# Patient Record
Sex: MALE | Race: ASIAN | NOT HISPANIC OR LATINO | URBAN - METROPOLITAN AREA
[De-identification: names, ages, dates, MRNs, and addresses within clinical notes are randomized per-mention and may not be internally consistent; named-entity substitution may affect disease eponyms.]

---

## 2017-01-01 ENCOUNTER — INPATIENT (INPATIENT)
Facility: HOSPITAL | Age: 77
LOS: 2 days | DRG: 871 | End: 2017-01-11
Attending: INTERNAL MEDICINE | Admitting: INTERNAL MEDICINE
Payer: MEDICARE

## 2017-01-01 VITALS
RESPIRATION RATE: 24 BRPM | OXYGEN SATURATION: 100 % | TEMPERATURE: 99 F | SYSTOLIC BLOOD PRESSURE: 170 MMHG | DIASTOLIC BLOOD PRESSURE: 81 MMHG | HEART RATE: 111 BPM

## 2017-01-01 VITALS
RESPIRATION RATE: 16 BRPM | SYSTOLIC BLOOD PRESSURE: 138 MMHG | TEMPERATURE: 97 F | HEART RATE: 105 BPM | OXYGEN SATURATION: 90 % | DIASTOLIC BLOOD PRESSURE: 88 MMHG

## 2017-01-01 DIAGNOSIS — K56.69 OTHER INTESTINAL OBSTRUCTION: ICD-10-CM

## 2017-01-01 DIAGNOSIS — Z95.810 PRESENCE OF AUTOMATIC (IMPLANTABLE) CARDIAC DEFIBRILLATOR: Chronic | ICD-10-CM

## 2017-01-01 LAB
ALBUMIN SERPL ELPH-MCNC: 2.7 G/DL — LOW (ref 3.3–5)
ALBUMIN SERPL ELPH-MCNC: 2.9 G/DL — LOW (ref 3.3–5)
ALBUMIN SERPL ELPH-MCNC: 3.1 G/DL — LOW (ref 3.3–5)
ALBUMIN SERPL ELPH-MCNC: 3.4 G/DL — SIGNIFICANT CHANGE UP (ref 3.3–5)
ALP SERPL-CCNC: 58 U/L — SIGNIFICANT CHANGE UP (ref 40–120)
ALP SERPL-CCNC: 60 U/L — SIGNIFICANT CHANGE UP (ref 40–120)
ALP SERPL-CCNC: 61 U/L — SIGNIFICANT CHANGE UP (ref 40–120)
ALP SERPL-CCNC: 73 U/L — SIGNIFICANT CHANGE UP (ref 40–120)
ALT FLD-CCNC: 15 U/L — SIGNIFICANT CHANGE UP (ref 12–78)
ALT FLD-CCNC: 16 U/L — SIGNIFICANT CHANGE UP (ref 12–78)
ANION GAP SERPL CALC-SCNC: 12 MMOL/L — SIGNIFICANT CHANGE UP (ref 5–17)
ANION GAP SERPL CALC-SCNC: 7 MMOL/L — SIGNIFICANT CHANGE UP (ref 5–17)
ANION GAP SERPL CALC-SCNC: 8 MMOL/L — SIGNIFICANT CHANGE UP (ref 5–17)
ANION GAP SERPL CALC-SCNC: 9 MMOL/L — SIGNIFICANT CHANGE UP (ref 5–17)
APTT BLD: 30.8 SEC — SIGNIFICANT CHANGE UP (ref 27.5–37.4)
AST SERPL-CCNC: 13 U/L — LOW (ref 15–37)
AST SERPL-CCNC: 15 U/L — SIGNIFICANT CHANGE UP (ref 15–37)
AST SERPL-CCNC: 15 U/L — SIGNIFICANT CHANGE UP (ref 15–37)
AST SERPL-CCNC: 33 U/L — SIGNIFICANT CHANGE UP (ref 15–37)
BASOPHILS # BLD AUTO: 0 K/UL — SIGNIFICANT CHANGE UP (ref 0–0.2)
BASOPHILS NFR BLD AUTO: 0.2 % — SIGNIFICANT CHANGE UP (ref 0–2)
BILIRUB DIRECT SERPL-MCNC: 0.2 MG/DL — SIGNIFICANT CHANGE UP (ref 0.05–0.2)
BILIRUB INDIRECT FLD-MCNC: 0.4 MG/DL — SIGNIFICANT CHANGE UP (ref 0.2–1)
BILIRUB SERPL-MCNC: 0.4 MG/DL — SIGNIFICANT CHANGE UP (ref 0.2–1.2)
BILIRUB SERPL-MCNC: 0.5 MG/DL — SIGNIFICANT CHANGE UP (ref 0.2–1.2)
BILIRUB SERPL-MCNC: 0.6 MG/DL — SIGNIFICANT CHANGE UP (ref 0.2–1.2)
BILIRUB SERPL-MCNC: 0.7 MG/DL — SIGNIFICANT CHANGE UP (ref 0.2–1.2)
BUN SERPL-MCNC: 38 MG/DL — HIGH (ref 7–23)
BUN SERPL-MCNC: 40 MG/DL — HIGH (ref 7–23)
BUN SERPL-MCNC: 40 MG/DL — HIGH (ref 7–23)
BUN SERPL-MCNC: 45 MG/DL — HIGH (ref 7–23)
CALCIUM SERPL-MCNC: 8.4 MG/DL — LOW (ref 8.5–10.1)
CALCIUM SERPL-MCNC: 8.4 MG/DL — LOW (ref 8.5–10.1)
CALCIUM SERPL-MCNC: 9.3 MG/DL — SIGNIFICANT CHANGE UP (ref 8.5–10.1)
CALCIUM SERPL-MCNC: 9.5 MG/DL — SIGNIFICANT CHANGE UP (ref 8.5–10.1)
CHLORIDE SERPL-SCNC: 88 MMOL/L — LOW (ref 96–108)
CHLORIDE SERPL-SCNC: 96 MMOL/L — SIGNIFICANT CHANGE UP (ref 96–108)
CHLORIDE SERPL-SCNC: 97 MMOL/L — SIGNIFICANT CHANGE UP (ref 96–108)
CHLORIDE SERPL-SCNC: 99 MMOL/L — SIGNIFICANT CHANGE UP (ref 96–108)
CK SERPL-CCNC: 189 U/L — SIGNIFICANT CHANGE UP (ref 26–308)
CK SERPL-CCNC: 36 U/L — SIGNIFICANT CHANGE UP (ref 26–308)
CK SERPL-CCNC: 46 U/L — SIGNIFICANT CHANGE UP (ref 26–308)
CO2 SERPL-SCNC: 33 MMOL/L — HIGH (ref 22–31)
CO2 SERPL-SCNC: 34 MMOL/L — HIGH (ref 22–31)
CO2 SERPL-SCNC: 36 MMOL/L — HIGH (ref 22–31)
CO2 SERPL-SCNC: 37 MMOL/L — HIGH (ref 22–31)
CREAT SERPL-MCNC: 1.3 MG/DL — SIGNIFICANT CHANGE UP (ref 0.5–1.3)
CREAT SERPL-MCNC: 1.4 MG/DL — HIGH (ref 0.5–1.3)
CREAT SERPL-MCNC: 1.5 MG/DL — HIGH (ref 0.5–1.3)
CREAT SERPL-MCNC: 1.9 MG/DL — HIGH (ref 0.5–1.3)
EOSINOPHIL # BLD AUTO: 0 K/UL — SIGNIFICANT CHANGE UP (ref 0–0.5)
EOSINOPHIL NFR BLD AUTO: 0.1 % — SIGNIFICANT CHANGE UP (ref 0–6)
GLUCOSE SERPL-MCNC: 152 MG/DL — HIGH (ref 70–99)
GLUCOSE SERPL-MCNC: 172 MG/DL — HIGH (ref 70–99)
GLUCOSE SERPL-MCNC: 173 MG/DL — HIGH (ref 70–99)
GLUCOSE SERPL-MCNC: 292 MG/DL — HIGH (ref 70–99)
HCT VFR BLD CALC: 37.7 % — LOW (ref 39–50)
HCT VFR BLD CALC: 38.6 % — LOW (ref 39–50)
HCT VFR BLD CALC: 41 % — SIGNIFICANT CHANGE UP (ref 39–50)
HCT VFR BLD CALC: 48.2 % — SIGNIFICANT CHANGE UP (ref 39–50)
HGB BLD-MCNC: 12.1 G/DL — LOW (ref 13–17)
HGB BLD-MCNC: 12.6 G/DL — LOW (ref 13–17)
HGB BLD-MCNC: 13.7 G/DL — SIGNIFICANT CHANGE UP (ref 13–17)
HGB BLD-MCNC: 15.5 G/DL — SIGNIFICANT CHANGE UP (ref 13–17)
INR BLD: 1.04 RATIO — SIGNIFICANT CHANGE UP (ref 0.88–1.16)
INR BLD: 1.06 RATIO — SIGNIFICANT CHANGE UP (ref 0.88–1.16)
INR BLD: 1.17 RATIO — HIGH (ref 0.88–1.16)
INR BLD: 1.29 RATIO — HIGH (ref 0.88–1.16)
LACTATE SERPL-SCNC: 1.9 MMOL/L — SIGNIFICANT CHANGE UP (ref 0.7–2)
LIDOCAIN IGE QN: 79 U/L — SIGNIFICANT CHANGE UP (ref 73–393)
LYMPHOCYTES # BLD AUTO: 0.7 K/UL — LOW (ref 1–3.3)
LYMPHOCYTES # BLD AUTO: 5 % — LOW (ref 13–44)
MAGNESIUM SERPL-MCNC: 2.4 MG/DL — SIGNIFICANT CHANGE UP (ref 1.8–2.4)
MAGNESIUM SERPL-MCNC: 2.5 MG/DL — HIGH (ref 1.8–2.4)
MAGNESIUM SERPL-MCNC: 2.6 MG/DL — HIGH (ref 1.8–2.4)
MCHC RBC-ENTMCNC: 27 PG — SIGNIFICANT CHANGE UP (ref 27–34)
MCHC RBC-ENTMCNC: 27.4 PG — SIGNIFICANT CHANGE UP (ref 27–34)
MCHC RBC-ENTMCNC: 27.7 PG — SIGNIFICANT CHANGE UP (ref 27–34)
MCHC RBC-ENTMCNC: 28 PG — SIGNIFICANT CHANGE UP (ref 27–34)
MCHC RBC-ENTMCNC: 32.1 GM/DL — SIGNIFICANT CHANGE UP (ref 32–36)
MCHC RBC-ENTMCNC: 32.2 GM/DL — SIGNIFICANT CHANGE UP (ref 32–36)
MCHC RBC-ENTMCNC: 32.7 GM/DL — SIGNIFICANT CHANGE UP (ref 32–36)
MCHC RBC-ENTMCNC: 33.3 GM/DL — SIGNIFICANT CHANGE UP (ref 32–36)
MCV RBC AUTO: 84.1 FL — SIGNIFICANT CHANGE UP (ref 80–100)
MCV RBC AUTO: 84.1 FL — SIGNIFICANT CHANGE UP (ref 80–100)
MCV RBC AUTO: 84.6 FL — SIGNIFICANT CHANGE UP (ref 80–100)
MCV RBC AUTO: 85.2 FL — SIGNIFICANT CHANGE UP (ref 80–100)
MONOCYTES # BLD AUTO: 0.7 K/UL — SIGNIFICANT CHANGE UP (ref 0–0.9)
MONOCYTES NFR BLD AUTO: 4.8 % — SIGNIFICANT CHANGE UP (ref 1–9)
NEUTROPHILS # BLD AUTO: 13.4 K/UL — HIGH (ref 1.8–7.4)
NEUTROPHILS NFR BLD AUTO: 89.9 % — HIGH (ref 43–77)
PHOSPHATE SERPL-MCNC: 2.3 MG/DL — LOW (ref 2.5–4.5)
PHOSPHATE SERPL-MCNC: 2.5 MG/DL — SIGNIFICANT CHANGE UP (ref 2.5–4.5)
PHOSPHATE SERPL-MCNC: 3.3 MG/DL — SIGNIFICANT CHANGE UP (ref 2.5–4.5)
PLATELET # BLD AUTO: 302 K/UL — SIGNIFICANT CHANGE UP (ref 150–400)
PLATELET # BLD AUTO: 302 K/UL — SIGNIFICANT CHANGE UP (ref 150–400)
PLATELET # BLD AUTO: 318 K/UL — SIGNIFICANT CHANGE UP (ref 150–400)
PLATELET # BLD AUTO: 343 K/UL — SIGNIFICANT CHANGE UP (ref 150–400)
POTASSIUM SERPL-MCNC: 3.2 MMOL/L — LOW (ref 3.5–5.3)
POTASSIUM SERPL-MCNC: 3.3 MMOL/L — LOW (ref 3.5–5.3)
POTASSIUM SERPL-MCNC: 3.7 MMOL/L — SIGNIFICANT CHANGE UP (ref 3.5–5.3)
POTASSIUM SERPL-MCNC: 3.7 MMOL/L — SIGNIFICANT CHANGE UP (ref 3.5–5.3)
POTASSIUM SERPL-SCNC: 3.2 MMOL/L — LOW (ref 3.5–5.3)
POTASSIUM SERPL-SCNC: 3.3 MMOL/L — LOW (ref 3.5–5.3)
POTASSIUM SERPL-SCNC: 3.7 MMOL/L — SIGNIFICANT CHANGE UP (ref 3.5–5.3)
POTASSIUM SERPL-SCNC: 3.7 MMOL/L — SIGNIFICANT CHANGE UP (ref 3.5–5.3)
PROCALCITONIN SERPL-MCNC: 0.17 NG/ML — HIGH (ref 0–0.05)
PROCALCITONIN SERPL-MCNC: 0.44 NG/ML — HIGH (ref 0–0.05)
PROT SERPL-MCNC: 6.7 G/DL — SIGNIFICANT CHANGE UP (ref 6–8.3)
PROT SERPL-MCNC: 6.8 G/DL — SIGNIFICANT CHANGE UP (ref 6–8.3)
PROT SERPL-MCNC: 7.1 G/DL — SIGNIFICANT CHANGE UP (ref 6–8.3)
PROT SERPL-MCNC: 8.1 G/DL — SIGNIFICANT CHANGE UP (ref 6–8.3)
PROTHROM AB SERPL-ACNC: 11.5 SEC — SIGNIFICANT CHANGE UP (ref 10–13.1)
PROTHROM AB SERPL-ACNC: 11.8 SEC — SIGNIFICANT CHANGE UP (ref 10–13.1)
PROTHROM AB SERPL-ACNC: 13 SEC — SIGNIFICANT CHANGE UP (ref 10–13.1)
PROTHROM AB SERPL-ACNC: 14.3 SEC — HIGH (ref 10–13.1)
RBC # BLD: 4.48 M/UL — SIGNIFICANT CHANGE UP (ref 4.2–5.8)
RBC # BLD: 4.57 M/UL — SIGNIFICANT CHANGE UP (ref 4.2–5.8)
RBC # BLD: 4.88 M/UL — SIGNIFICANT CHANGE UP (ref 4.2–5.8)
RBC # BLD: 5.66 M/UL — SIGNIFICANT CHANGE UP (ref 4.2–5.8)
RBC # FLD: 12.4 % — SIGNIFICANT CHANGE UP (ref 10.3–14.5)
RBC # FLD: 12.4 % — SIGNIFICANT CHANGE UP (ref 10.3–14.5)
RBC # FLD: 12.5 % — SIGNIFICANT CHANGE UP (ref 10.3–14.5)
RBC # FLD: 12.9 % — SIGNIFICANT CHANGE UP (ref 10.3–14.5)
SODIUM SERPL-SCNC: 137 MMOL/L — SIGNIFICANT CHANGE UP (ref 135–145)
SODIUM SERPL-SCNC: 139 MMOL/L — SIGNIFICANT CHANGE UP (ref 135–145)
SODIUM SERPL-SCNC: 139 MMOL/L — SIGNIFICANT CHANGE UP (ref 135–145)
SODIUM SERPL-SCNC: 141 MMOL/L — SIGNIFICANT CHANGE UP (ref 135–145)
TROPONIN I SERPL-MCNC: 0.03 NG/ML — SIGNIFICANT CHANGE UP (ref 0.01–0.04)
WBC # BLD: 14.9 K/UL — HIGH (ref 3.8–10.5)
WBC # BLD: 7.9 K/UL — SIGNIFICANT CHANGE UP (ref 3.8–10.5)
WBC # BLD: 7.9 K/UL — SIGNIFICANT CHANGE UP (ref 3.8–10.5)
WBC # BLD: 8.8 K/UL — SIGNIFICANT CHANGE UP (ref 3.8–10.5)
WBC # FLD AUTO: 14.9 K/UL — HIGH (ref 3.8–10.5)
WBC # FLD AUTO: 7.9 K/UL — SIGNIFICANT CHANGE UP (ref 3.8–10.5)
WBC # FLD AUTO: 7.9 K/UL — SIGNIFICANT CHANGE UP (ref 3.8–10.5)
WBC # FLD AUTO: 8.8 K/UL — SIGNIFICANT CHANGE UP (ref 3.8–10.5)

## 2017-01-01 PROCEDURE — 85610 PROTHROMBIN TIME: CPT

## 2017-01-01 PROCEDURE — 93005 ELECTROCARDIOGRAM TRACING: CPT

## 2017-01-01 PROCEDURE — 82248 BILIRUBIN DIRECT: CPT

## 2017-01-01 PROCEDURE — 83735 ASSAY OF MAGNESIUM: CPT

## 2017-01-01 PROCEDURE — 94760 N-INVAS EAR/PLS OXIMETRY 1: CPT

## 2017-01-01 PROCEDURE — 96374 THER/PROPH/DIAG INJ IV PUSH: CPT

## 2017-01-01 PROCEDURE — 71010: CPT | Mod: 26

## 2017-01-01 PROCEDURE — 71010: CPT | Mod: 26,77

## 2017-01-01 PROCEDURE — 85730 THROMBOPLASTIN TIME PARTIAL: CPT

## 2017-01-01 PROCEDURE — 83605 ASSAY OF LACTIC ACID: CPT

## 2017-01-01 PROCEDURE — 96372 THER/PROPH/DIAG INJ SC/IM: CPT | Mod: 59

## 2017-01-01 PROCEDURE — 96376 TX/PRO/DX INJ SAME DRUG ADON: CPT

## 2017-01-01 PROCEDURE — 74176 CT ABD & PELVIS W/O CONTRAST: CPT | Mod: 26

## 2017-01-01 PROCEDURE — 93010 ELECTROCARDIOGRAM REPORT: CPT

## 2017-01-01 PROCEDURE — 71045 X-RAY EXAM CHEST 1 VIEW: CPT

## 2017-01-01 PROCEDURE — 80053 COMPREHEN METABOLIC PANEL: CPT

## 2017-01-01 PROCEDURE — 85027 COMPLETE CBC AUTOMATED: CPT

## 2017-01-01 PROCEDURE — 82550 ASSAY OF CK (CPK): CPT

## 2017-01-01 PROCEDURE — 84100 ASSAY OF PHOSPHORUS: CPT

## 2017-01-01 PROCEDURE — 87040 BLOOD CULTURE FOR BACTERIA: CPT

## 2017-01-01 PROCEDURE — 74020: CPT | Mod: 26

## 2017-01-01 PROCEDURE — 99221 1ST HOSP IP/OBS SF/LOW 40: CPT

## 2017-01-01 PROCEDURE — 83690 ASSAY OF LIPASE: CPT

## 2017-01-01 PROCEDURE — 74176 CT ABD & PELVIS W/O CONTRAST: CPT

## 2017-01-01 PROCEDURE — 99285 EMERGENCY DEPT VISIT HI MDM: CPT | Mod: 25

## 2017-01-01 PROCEDURE — 96375 TX/PRO/DX INJ NEW DRUG ADDON: CPT

## 2017-01-01 PROCEDURE — 94640 AIRWAY INHALATION TREATMENT: CPT

## 2017-01-01 PROCEDURE — 74020: CPT

## 2017-01-01 PROCEDURE — 84484 ASSAY OF TROPONIN QUANT: CPT

## 2017-01-01 PROCEDURE — 94660 CPAP INITIATION&MGMT: CPT

## 2017-01-01 PROCEDURE — 99285 EMERGENCY DEPT VISIT HI MDM: CPT

## 2017-01-01 PROCEDURE — 84145 PROCALCITONIN (PCT): CPT

## 2017-01-01 RX ORDER — SODIUM CHLORIDE 9 MG/ML
1000 INJECTION, SOLUTION INTRAVENOUS
Qty: 0 | Refills: 0 | Status: DISCONTINUED | OUTPATIENT
Start: 2017-01-01 | End: 2017-01-01

## 2017-01-01 RX ORDER — PANTOPRAZOLE SODIUM 20 MG/1
40 TABLET, DELAYED RELEASE ORAL EVERY 24 HOURS
Qty: 0 | Refills: 0 | Status: DISCONTINUED | OUTPATIENT
Start: 2017-01-01 | End: 2017-01-01

## 2017-01-01 RX ORDER — PETROLATUM,WHITE
1 JELLY (GRAM) TOPICAL
Qty: 0 | Refills: 0 | Status: DISCONTINUED | OUTPATIENT
Start: 2017-01-01 | End: 2017-01-01

## 2017-01-01 RX ORDER — METOPROLOL TARTRATE 50 MG
50 TABLET ORAL DAILY
Qty: 0 | Refills: 0 | Status: DISCONTINUED | OUTPATIENT
Start: 2017-01-01 | End: 2017-01-01

## 2017-01-01 RX ORDER — ATORVASTATIN CALCIUM 80 MG/1
10 TABLET, FILM COATED ORAL AT BEDTIME
Qty: 0 | Refills: 0 | Status: DISCONTINUED | OUTPATIENT
Start: 2017-01-01 | End: 2017-01-01

## 2017-01-01 RX ORDER — INSULIN LISPRO 100/ML
VIAL (ML) SUBCUTANEOUS
Qty: 0 | Refills: 0 | Status: DISCONTINUED | OUTPATIENT
Start: 2017-01-01 | End: 2017-01-01

## 2017-01-01 RX ORDER — POTASSIUM PHOSPHATE, MONOBASIC POTASSIUM PHOSPHATE, DIBASIC 236; 224 MG/ML; MG/ML
15 INJECTION, SOLUTION INTRAVENOUS ONCE
Qty: 0 | Refills: 0 | Status: COMPLETED | OUTPATIENT
Start: 2017-01-01 | End: 2017-01-01

## 2017-01-01 RX ORDER — HALOPERIDOL DECANOATE 100 MG/ML
1 INJECTION INTRAMUSCULAR ONCE
Qty: 0 | Refills: 0 | Status: COMPLETED | OUTPATIENT
Start: 2017-01-01 | End: 2017-01-01

## 2017-01-01 RX ORDER — DEXTROSE 50 % IN WATER 50 %
25 SYRINGE (ML) INTRAVENOUS ONCE
Qty: 0 | Refills: 0 | Status: DISCONTINUED | OUTPATIENT
Start: 2017-01-01 | End: 2017-01-01

## 2017-01-01 RX ORDER — HYDRALAZINE HCL 50 MG
10 TABLET ORAL
Qty: 0 | Refills: 0 | Status: DISCONTINUED | OUTPATIENT
Start: 2017-01-01 | End: 2017-01-01

## 2017-01-01 RX ORDER — AZTREONAM 2 G
1000 VIAL (EA) INJECTION EVERY 8 HOURS
Qty: 0 | Refills: 0 | Status: DISCONTINUED | OUTPATIENT
Start: 2017-01-01 | End: 2017-01-01

## 2017-01-01 RX ORDER — METRONIDAZOLE 500 MG
TABLET ORAL
Qty: 0 | Refills: 0 | Status: DISCONTINUED | OUTPATIENT
Start: 2017-01-01 | End: 2017-01-01

## 2017-01-01 RX ORDER — DEXTROSE 50 % IN WATER 50 %
12.5 SYRINGE (ML) INTRAVENOUS ONCE
Qty: 0 | Refills: 0 | Status: DISCONTINUED | OUTPATIENT
Start: 2017-01-01 | End: 2017-01-01

## 2017-01-01 RX ORDER — FUROSEMIDE 40 MG
40 TABLET ORAL ONCE
Qty: 0 | Refills: 0 | Status: COMPLETED | OUTPATIENT
Start: 2017-01-01 | End: 2017-01-01

## 2017-01-01 RX ORDER — FUROSEMIDE 40 MG
40 TABLET ORAL ONCE
Qty: 0 | Refills: 0 | Status: DISCONTINUED | OUTPATIENT
Start: 2017-01-01 | End: 2017-01-01

## 2017-01-01 RX ORDER — SODIUM CHLORIDE 9 MG/ML
3 INJECTION INTRAMUSCULAR; INTRAVENOUS; SUBCUTANEOUS ONCE
Qty: 0 | Refills: 0 | Status: COMPLETED | OUTPATIENT
Start: 2017-01-01 | End: 2017-01-01

## 2017-01-01 RX ORDER — GLUCAGON INJECTION, SOLUTION 0.5 MG/.1ML
1 INJECTION, SOLUTION SUBCUTANEOUS ONCE
Qty: 0 | Refills: 0 | Status: DISCONTINUED | OUTPATIENT
Start: 2017-01-01 | End: 2017-01-01

## 2017-01-01 RX ORDER — DEXTROSE 50 % IN WATER 50 %
1 SYRINGE (ML) INTRAVENOUS ONCE
Qty: 0 | Refills: 0 | Status: DISCONTINUED | OUTPATIENT
Start: 2017-01-01 | End: 2017-01-01

## 2017-01-01 RX ORDER — METOPROLOL TARTRATE 50 MG
2.5 TABLET ORAL EVERY 8 HOURS
Qty: 0 | Refills: 0 | Status: DISCONTINUED | OUTPATIENT
Start: 2017-01-01 | End: 2017-01-01

## 2017-01-01 RX ORDER — ISOSORBIDE DINITRATE 5 MG/1
20 TABLET ORAL THREE TIMES A DAY
Qty: 0 | Refills: 0 | Status: DISCONTINUED | OUTPATIENT
Start: 2017-01-01 | End: 2017-01-01

## 2017-01-01 RX ORDER — AZTREONAM 2 G
1000 VIAL (EA) INJECTION ONCE
Qty: 0 | Refills: 0 | Status: COMPLETED | OUTPATIENT
Start: 2017-01-01 | End: 2017-01-01

## 2017-01-01 RX ORDER — AZTREONAM 2 G
VIAL (EA) INJECTION
Qty: 0 | Refills: 0 | Status: DISCONTINUED | OUTPATIENT
Start: 2017-01-01 | End: 2017-01-01

## 2017-01-01 RX ORDER — MIRTAZAPINE 45 MG/1
7.5 TABLET, ORALLY DISINTEGRATING ORAL DAILY
Qty: 0 | Refills: 0 | Status: DISCONTINUED | OUTPATIENT
Start: 2017-01-01 | End: 2017-01-01

## 2017-01-01 RX ORDER — METRONIDAZOLE 500 MG
500 TABLET ORAL EVERY 8 HOURS
Qty: 0 | Refills: 0 | Status: DISCONTINUED | OUTPATIENT
Start: 2017-01-01 | End: 2017-01-01

## 2017-01-01 RX ORDER — FAMOTIDINE 10 MG/ML
20 INJECTION INTRAVENOUS ONCE
Qty: 0 | Refills: 0 | Status: COMPLETED | OUTPATIENT
Start: 2017-01-01 | End: 2017-01-01

## 2017-01-01 RX ORDER — SODIUM CHLORIDE 9 MG/ML
1000 INJECTION INTRAMUSCULAR; INTRAVENOUS; SUBCUTANEOUS ONCE
Qty: 0 | Refills: 0 | Status: COMPLETED | OUTPATIENT
Start: 2017-01-01 | End: 2017-01-01

## 2017-01-01 RX ORDER — METRONIDAZOLE 500 MG
500 TABLET ORAL ONCE
Qty: 0 | Refills: 0 | Status: COMPLETED | OUTPATIENT
Start: 2017-01-01 | End: 2017-01-01

## 2017-01-01 RX ORDER — IPRATROPIUM/ALBUTEROL SULFATE 18-103MCG
3 AEROSOL WITH ADAPTER (GRAM) INHALATION THREE TIMES A DAY
Qty: 0 | Refills: 0 | Status: DISCONTINUED | OUTPATIENT
Start: 2017-01-01 | End: 2017-01-01

## 2017-01-01 RX ORDER — CLOPIDOGREL BISULFATE 75 MG/1
75 TABLET, FILM COATED ORAL DAILY
Qty: 0 | Refills: 0 | Status: DISCONTINUED | OUTPATIENT
Start: 2017-01-01 | End: 2017-01-01

## 2017-01-01 RX ORDER — POTASSIUM CHLORIDE 20 MEQ
20 PACKET (EA) ORAL
Qty: 0 | Refills: 0 | Status: DISCONTINUED | OUTPATIENT
Start: 2017-01-01 | End: 2017-01-01

## 2017-01-01 RX ORDER — ONDANSETRON 8 MG/1
4 TABLET, FILM COATED ORAL ONCE
Qty: 0 | Refills: 0 | Status: COMPLETED | OUTPATIENT
Start: 2017-01-01 | End: 2017-01-01

## 2017-01-01 RX ORDER — HEPARIN SODIUM 5000 [USP'U]/ML
5000 INJECTION INTRAVENOUS; SUBCUTANEOUS EVERY 12 HOURS
Qty: 0 | Refills: 0 | Status: DISCONTINUED | OUTPATIENT
Start: 2017-01-01 | End: 2017-01-01

## 2017-01-01 RX ADMIN — Medication 2.5 MILLIGRAM(S): at 05:01

## 2017-01-01 RX ADMIN — PANTOPRAZOLE SODIUM 40 MILLIGRAM(S): 20 TABLET, DELAYED RELEASE ORAL at 11:59

## 2017-01-01 RX ADMIN — HEPARIN SODIUM 5000 UNIT(S): 5000 INJECTION INTRAVENOUS; SUBCUTANEOUS at 05:58

## 2017-01-01 RX ADMIN — Medication 1: at 11:22

## 2017-01-01 RX ADMIN — Medication 2.5 MILLIGRAM(S): at 13:47

## 2017-01-01 RX ADMIN — PANTOPRAZOLE SODIUM 40 MILLIGRAM(S): 20 TABLET, DELAYED RELEASE ORAL at 11:11

## 2017-01-01 RX ADMIN — PANTOPRAZOLE SODIUM 40 MILLIGRAM(S): 20 TABLET, DELAYED RELEASE ORAL at 11:10

## 2017-01-01 RX ADMIN — Medication 40 MILLIGRAM(S): at 09:42

## 2017-01-01 RX ADMIN — SODIUM CHLORIDE 3 MILLILITER(S): 9 INJECTION INTRAMUSCULAR; INTRAVENOUS; SUBCUTANEOUS at 09:53

## 2017-01-01 RX ADMIN — Medication 3 MILLILITER(S): at 09:40

## 2017-01-01 RX ADMIN — Medication 3 MILLILITER(S): at 09:00

## 2017-01-01 RX ADMIN — SODIUM CHLORIDE 100 MILLILITER(S): 9 INJECTION, SOLUTION INTRAVENOUS at 15:09

## 2017-01-01 RX ADMIN — Medication 10 MILLIGRAM(S): at 17:44

## 2017-01-01 RX ADMIN — HEPARIN SODIUM 5000 UNIT(S): 5000 INJECTION INTRAVENOUS; SUBCUTANEOUS at 05:01

## 2017-01-01 RX ADMIN — Medication 1 APPLICATION(S): at 17:27

## 2017-01-01 RX ADMIN — HEPARIN SODIUM 5000 UNIT(S): 5000 INJECTION INTRAVENOUS; SUBCUTANEOUS at 17:46

## 2017-01-01 RX ADMIN — Medication 2.5 MILLIGRAM(S): at 21:08

## 2017-01-01 RX ADMIN — Medication 50 MILLIGRAM(S): at 22:11

## 2017-01-01 RX ADMIN — Medication 3 MILLILITER(S): at 21:40

## 2017-01-01 RX ADMIN — Medication 2.5 MILLIGRAM(S): at 05:29

## 2017-01-01 RX ADMIN — FAMOTIDINE 20 MILLIGRAM(S): 10 INJECTION INTRAVENOUS at 09:46

## 2017-01-01 RX ADMIN — HEPARIN SODIUM 5000 UNIT(S): 5000 INJECTION INTRAVENOUS; SUBCUTANEOUS at 05:29

## 2017-01-01 RX ADMIN — HALOPERIDOL DECANOATE 1 MILLIGRAM(S): 100 INJECTION INTRAMUSCULAR at 22:10

## 2017-01-01 RX ADMIN — Medication 2.5 MILLIGRAM(S): at 14:45

## 2017-01-01 RX ADMIN — Medication 100 MILLIGRAM(S): at 05:30

## 2017-01-01 RX ADMIN — Medication 10 MILLIGRAM(S): at 05:01

## 2017-01-01 RX ADMIN — Medication 3 MILLILITER(S): at 15:03

## 2017-01-01 RX ADMIN — Medication 50 MILLIGRAM(S): at 22:00

## 2017-01-01 RX ADMIN — HALOPERIDOL DECANOATE 1 MILLIGRAM(S): 100 INJECTION INTRAMUSCULAR at 05:32

## 2017-01-01 RX ADMIN — Medication 50 MILLIGRAM(S): at 05:30

## 2017-01-01 RX ADMIN — SODIUM CHLORIDE 50 MILLILITER(S): 9 INJECTION, SOLUTION INTRAVENOUS at 09:42

## 2017-01-01 RX ADMIN — Medication 2.5 MILLIGRAM(S): at 13:10

## 2017-01-01 RX ADMIN — Medication 3 MILLILITER(S): at 13:10

## 2017-01-01 RX ADMIN — Medication 1: at 08:11

## 2017-01-01 RX ADMIN — Medication 3 MILLILITER(S): at 08:16

## 2017-01-01 RX ADMIN — HEPARIN SODIUM 5000 UNIT(S): 5000 INJECTION INTRAVENOUS; SUBCUTANEOUS at 17:20

## 2017-01-01 RX ADMIN — ONDANSETRON 4 MILLIGRAM(S): 8 TABLET, FILM COATED ORAL at 09:46

## 2017-01-01 RX ADMIN — Medication 1 APPLICATION(S): at 05:01

## 2017-01-01 RX ADMIN — SODIUM CHLORIDE 50 MILLILITER(S): 9 INJECTION, SOLUTION INTRAVENOUS at 05:00

## 2017-01-01 RX ADMIN — Medication 3 MILLILITER(S): at 14:14

## 2017-01-01 RX ADMIN — Medication 100 MILLIGRAM(S): at 22:11

## 2017-01-01 RX ADMIN — HEPARIN SODIUM 5000 UNIT(S): 5000 INJECTION INTRAVENOUS; SUBCUTANEOUS at 17:44

## 2017-01-01 RX ADMIN — SODIUM CHLORIDE 2000 MILLILITER(S): 9 INJECTION INTRAMUSCULAR; INTRAVENOUS; SUBCUTANEOUS at 09:46

## 2017-01-01 RX ADMIN — Medication 1: at 17:47

## 2017-01-01 RX ADMIN — Medication 100 MILLIGRAM(S): at 13:43

## 2017-01-01 RX ADMIN — Medication 2.5 MILLIGRAM(S): at 22:11

## 2017-01-01 RX ADMIN — Medication 3 MILLILITER(S): at 19:46

## 2017-01-01 RX ADMIN — Medication 100 MILLIGRAM(S): at 23:45

## 2017-01-01 RX ADMIN — Medication 100 MILLIGRAM(S): at 06:02

## 2017-01-01 RX ADMIN — Medication 50 MILLIGRAM(S): at 14:50

## 2017-01-01 RX ADMIN — Medication 100 MILLIGRAM(S): at 12:24

## 2017-01-01 RX ADMIN — POTASSIUM PHOSPHATE, MONOBASIC POTASSIUM PHOSPHATE, DIBASIC 63.75 MILLIMOLE(S): 236; 224 INJECTION, SOLUTION INTRAVENOUS at 10:38

## 2017-01-01 RX ADMIN — Medication 2: at 11:59

## 2017-01-01 RX ADMIN — Medication 50 MILLIGRAM(S): at 13:16

## 2017-01-01 RX ADMIN — Medication 2.5 MILLIGRAM(S): at 05:42

## 2017-01-01 RX ADMIN — Medication 50 MILLIGRAM(S): at 05:00

## 2017-01-08 NOTE — ED ADULT NURSE REASSESSMENT NOTE - NS ED NURSE REASSESS COMMENT FT1
Pt CT shows SBO - NG Tube 18F placed without difficulty - 650ml watery brown output. Will cont. to monitor.

## 2017-01-08 NOTE — H&P ADULT. - HISTORY OF PRESENT ILLNESS
CONTACT Sonu Geller   ALLERGY pcn  REASON FOR VISIT   Initial evaluation/Admission HNP/Pulmonary consultation requested  1/8/2017  from Dr Angela by Dr Cabral   Patient examined chart reviewed  HOSPITAL ADMISSION [facility] Dr Cabral 1/8/2017 1/8/2017 afeb 92 140/80 18 RA 93%  INITIAL EVALUATION    76 lives with son, fully active 6 mo ago then had a fall and since then  walks using cane and since 10/2016 homebound due to weakness PMH CAD stents 2 y ago PPM Asthma DM Parkinsons MI 10 y hosp [facility] 11/20-11/29/2016 with resp distress CHFrEF EF 25% (11/21) NSTEMI (11/21/2016) sent to [facility] for [facility]O 11/30-1/8/2017 Patient sent to [facility] ER because of vomiting on and off x 2 d admitted [facility] 1/8/2017 with distal small bowel obstruction  PMH CAD stents 2 y ago PPM Asthma DM Parkinsons MI 10 y developed  progressive SOB and Bipedam  edema kobi last 3-4 d and admitted [facility] 11/20-11/29/2016 with resp distress sec to  CHF Ruled in for MI 11/20-11/21-11/22 ck [phone] tR 1 .524 (i) - 1.94 (i) -1.92 (i)- 1.04 (i) CHFrEF    11/21 ECHO ef 25% RVSP 75 Mod bl effsn PLEURAL EFFUSION 11/23 CT Ch Mod to large R pl effsn 11/23  dw Dr Preciado  who felt that it is risk prohibitive to hold plavix for thoracentesis ELEVATED LFTS 11/23/2016 AP 62 AST 46   poss sec congestion   AMS 11/23 CT H n COPD  Rxed with  duoneb.3  Patient was sent to [facility] for [facility]  11/30-1/8/2017   ER MEDS GIVEN BEFORE 1/8 11a  ns 1l pepcid 20 ondasetron   1/8/2017 W 14.9 Hb 15.5 Plt 343  Na 137 K 3.7 CO2 37 Cr 1.9  SMALL BOWEL OBSTRUCTION POA 1/8/2017  CTAP NC (1/8)  1) CM 2) Basal atelectasis 3) Sl thickened L adrenal 4) Marked distendd stmach 5) Multiple fluid and gas filled small bowel loops transitioning n RLQ cw high grade small bowel obstruction 6) No pneumatosis free air free fluid or obstructing mass   1/8/2017 npo GI surg eval   LEUKOCYTOSIS POSSIBLY SECONDARY TO ABDOMINAL SEPSIS POA 1/8/2017 1/8/2017 W 14.9  1/8/2017 bsab  CKD 3  11/28/2016 Cr 1.7 1/8/2017 Cr 1.9   1/8/2017 Possibly a little dehydrated as 11/28 Hb was 12.2 and now is 15.5   1/8/2017 1/2  (1/8)   COPD BD   CAD Metoprolol 2.5x3 (1/8)   CHFrEF  11/21 ECHO ef 25% RVSP 75 Mod bl   1/8/2017 CHF meds held as hes dehydrated   DIABETES sl scale (1/8)  DIET npo (1/8)   DVT P   HSC (1/8)   SUP Protonix 40 (1/8)

## 2017-01-08 NOTE — ED ADULT NURSE NOTE - CHIEF COMPLAINT QUOTE
pt from [facility], sent for abd pain, n/v, decreased appetite, iv left forearm from nursing home

## 2017-01-08 NOTE — PATIENT PROFILE ADULT. - ABILITY TO HEAR (WITH HEARING AID OR HEARING APPLIANCE IF NORMALLY USED):
Mildly to Moderately Impaired: difficulty hearing in some environments or speaker may need to increase volume or speak distinctly/[place]

## 2017-01-08 NOTE — ED ADULT NURSE NOTE - OBJECTIVE STATEMENT
Pt came to ED sent from [facility] c/o abdominal pain. Patient is a/ox2 c/o abd pain with burping noted. CT shows SBO. NG tube 18f placed on low continuous suction. 650ml output once NG tube placed. All VSS. Will cont. to monitor.

## 2017-01-08 NOTE — ED ADULT NURSE NOTE - PSH
AICD (automatic cardioverter/defibrillator) present    No significant past surgical history    No significant past surgical history    S/P angioplasty with stent  total 5-6 stents

## 2017-01-08 NOTE — ED ADULT NURSE NOTE - PMH
Asthma    Asthma    CAD (coronary artery disease)    Diabetes  type 2  Diabetes    Diabetes mellitus    Family history of coronary artery disease    Gastritis    Heart attack    HTN (hypertension)    Hypercholesteremia    Hypertension    Hypertension    MI (myocardial infarction)  5 years ago  Pacemaker    Parkinson disease    Reflux    Stented coronary artery  stents to LAD, mRCA, dRCA, OM1, last in 2013  VT (ventricular tachycardia)

## 2017-01-08 NOTE — ED ADULT TRIAGE NOTE - CHIEF COMPLAINT QUOTE
pt from [facility], sent for abd pain, n/v, decreased appetite, iv left forearm from nursing home

## 2017-01-08 NOTE — H&P ADULT. - FAMILY HISTORY
No pertinent family history in first degree relatives  No pertinent family history in first degree relatives

## 2017-01-08 NOTE — H&P ADULT. - ASSESSMENT
CONTACT Sonu Geller   ALLERGY pcn  REASON FOR VISIT   Initial evaluation/Admission HNP/Pulmonary consultation requested  1/8/2017  from Dr Angela by Dr Cabral   Patient examined chart reviewed  HOSPITAL ADMISSION [facility] Dr Cabral 1/8/2017 1/8/2017 afeb 92 140/80 18 RA 93%  INITIAL EVALUATION    76 lives with son, fully active 6 mo ago then had a fall and since then  walks using cane and since 10/2016 homebound due to weakness PMH CAD stents 2 y ago PPM Asthma DM Parkinsons MI 10 y hosp [facility] 11/20-11/29/2016 with resp distress CHFrEF EF 25% (11/21) NSTEMI (11/21/2016) sent to [facility] for [facility]O 11/30-1/8/2017 Patient sent to [facility] ER because of vomiting on and off x 2 d admitted [facility] 1/8/2017 with distal small bowel obstruction  PMH CAD stents 2 y ago PPM Asthma DM Parkinsons MI 10 y developed  progressive SOB and Bipedam  edema kobi last 3-4 d and admitted [facility] 11/20-11/29/2016 with resp distress sec to  CHF Ruled in for MI 11/20-11/21-11/22 ck [phone] tR 1 .524 (i) - 1.94 (i) -1.92 (i)- 1.04 (i) CHFrEF    11/21 ECHO ef 25% RVSP 75 Mod bl effsn PLEURAL EFFUSION 11/23 CT Ch Mod to large R pl effsn 11/23  dw Dr Preciado  who felt that it is risk prohibitive to hold plavix for thoracentesis ELEVATED LFTS 11/23/2016 AP 62 AST 46   poss sec congestion   AMS 11/23 CT H n COPD  Rxed with  duoneb.3  Patient was sent to [facility] for [facility]  11/30-1/8/2017   ER MEDS GIVEN BEFORE 1/8 11a  ns 1l pepcid 20 ondasetron   1/8/2017 W 14.9 Hb 15.5 Plt 343  Na 137 K 3.7 CO2 37 Cr 1.9  SMALL BOWEL OBSTRUCTION POA 1/8/2017  CTAP NC (1/8)  1) CM 2) Basal atelectasis 3) Sl thickened L adrenal 4) Marked distendd stmach 5) Multiple fluid and gas filled small bowel loops transitioning n RLQ cw high grade small bowel obstruction 6) No pneumatosis free air free fluid or obstructing mass   1/8/2017 npo GI surg eval   LEUKOCYTOSIS POSSIBLY SECONDARY TO ABDOMINAL SEPSIS POA 1/8/2017 1/8/2017 W 14.9  1/8/2017 bsab  CKD 3  11/28/2016 Cr 1.7 1/8/2017 Cr 1.9   1/8/2017 Possibly a little dehydrated as 11/28 Hb was 12.2 and now is 15.5   1/8/2017 1/2  (1/8)   COPD BD   CAD Metoprolol 2.5x3 (1/8)   CHFrEF  11/21 ECHO ef 25% RVSP 75 Mod bl   1/8/2017 CHF meds held as hes dehydrated   DIABETES sl scale (1/8)  DIET npo (1/8)   DVT P   HSC (1/8)   SUP Protonix 40 (1/8)

## 2017-01-08 NOTE — PATIENT PROFILE ADULT. - NUTRITION PROFILE
do you have any questions about your prescribed diet?/nausea/vomiting greater or equal to 3 days prior to admit

## 2017-01-09 NOTE — GOALS OF CARE CONVERSATION - PERSONAL ADVANCE DIRECTIVE - CONVERSATION DETAILS
spoke to pts son Yimi regarding pts condition and recent decline. we discussed resuscitation explained CPR and he states he understands and does not want CPR attempted. we did discuss intubation but he is unsure at this time. he wants to speak to his siblings before making that decision.

## 2017-01-13 DIAGNOSIS — E83.39 OTHER DISORDERS OF PHOSPHORUS METABOLISM: ICD-10-CM

## 2017-01-13 DIAGNOSIS — Z88.0 ALLERGY STATUS TO PENICILLIN: ICD-10-CM

## 2017-01-13 DIAGNOSIS — N18.3 CHRONIC KIDNEY DISEASE, STAGE 3 (MODERATE): ICD-10-CM

## 2017-01-13 DIAGNOSIS — J44.9 CHRONIC OBSTRUCTIVE PULMONARY DISEASE, UNSPECIFIED: ICD-10-CM

## 2017-01-13 DIAGNOSIS — J96.01 ACUTE RESPIRATORY FAILURE WITH HYPOXIA: ICD-10-CM

## 2017-01-13 DIAGNOSIS — E87.6 HYPOKALEMIA: ICD-10-CM

## 2017-01-13 DIAGNOSIS — Z79.02 LONG TERM (CURRENT) USE OF ANTITHROMBOTICS/ANTIPLATELETS: ICD-10-CM

## 2017-01-13 DIAGNOSIS — I13.0 HYPERTENSIVE HEART AND CHRONIC KIDNEY DISEASE WITH HEART FAILURE AND STAGE 1 THROUGH STAGE 4 CHRONIC KIDNEY DISEASE, OR UNSPECIFIED CHRONIC KIDNEY DISEASE: ICD-10-CM

## 2017-01-13 DIAGNOSIS — E78.5 HYPERLIPIDEMIA, UNSPECIFIED: ICD-10-CM

## 2017-01-13 DIAGNOSIS — I50.22 CHRONIC SYSTOLIC (CONGESTIVE) HEART FAILURE: ICD-10-CM

## 2017-01-13 DIAGNOSIS — A41.9 SEPSIS, UNSPECIFIED ORGANISM: ICD-10-CM

## 2017-01-13 DIAGNOSIS — G20 PARKINSON'S DISEASE: ICD-10-CM

## 2017-01-13 DIAGNOSIS — K56.60 UNSPECIFIED INTESTINAL OBSTRUCTION: ICD-10-CM

## 2017-01-13 DIAGNOSIS — Z95.810 PRESENCE OF AUTOMATIC (IMPLANTABLE) CARDIAC DEFIBRILLATOR: ICD-10-CM

## 2017-01-13 DIAGNOSIS — Z79.82 LONG TERM (CURRENT) USE OF ASPIRIN: ICD-10-CM

## 2017-01-13 DIAGNOSIS — I25.2 OLD MYOCARDIAL INFARCTION: ICD-10-CM

## 2017-01-13 DIAGNOSIS — F02.80 DEMENTIA IN OTHER DISEASES CLASSIFIED ELSEWHERE, UNSPECIFIED SEVERITY, WITHOUT BEHAVIORAL DISTURBANCE, PSYCHOTIC DISTURBANCE, MOOD DISTURBANCE, AND ANXIETY: ICD-10-CM

## 2017-01-13 DIAGNOSIS — I25.10 ATHEROSCLEROTIC HEART DISEASE OF NATIVE CORONARY ARTERY WITHOUT ANGINA PECTORIS: ICD-10-CM

## 2017-01-13 DIAGNOSIS — E86.0 DEHYDRATION: ICD-10-CM

## 2017-01-13 DIAGNOSIS — Z51.5 ENCOUNTER FOR PALLIATIVE CARE: ICD-10-CM

## 2017-01-13 DIAGNOSIS — E11.9 TYPE 2 DIABETES MELLITUS WITHOUT COMPLICATIONS: ICD-10-CM

## 2017-01-13 DIAGNOSIS — I47.2 VENTRICULAR TACHYCARDIA: ICD-10-CM

## 2017-01-13 DIAGNOSIS — Z79.4 LONG TERM (CURRENT) USE OF INSULIN: ICD-10-CM

## 2017-01-13 DIAGNOSIS — J45.909 UNSPECIFIED ASTHMA, UNCOMPLICATED: ICD-10-CM

## 2017-01-13 DIAGNOSIS — N17.9 ACUTE KIDNEY FAILURE, UNSPECIFIED: ICD-10-CM

## 2017-01-13 DIAGNOSIS — Z66 DO NOT RESUSCITATE: ICD-10-CM

## 2017-01-13 LAB
CULTURE RESULTS: SIGNIFICANT CHANGE UP
SPECIMEN SOURCE: SIGNIFICANT CHANGE UP

## 2017-01-18 NOTE — DISCHARGE NOTE FOR THE EXPIRED PATIENT - SECONDARY DIAGNOSIS.
CAD (coronary artery disease) Diabetes mellitus HTN (hypertension) Asthma Parkinson disease VT (ventricular tachycardia) Stented coronary artery AICD (automatic cardioverter/defibrillator) present Reflux

## 2017-01-18 NOTE — DISCHARGE NOTE FOR THE EXPIRED PATIENT - HOSPITAL COURSE
1/8/2017 afeb 92 140/80 18 RA 93%  INITIAL EVALUATION    76 lives with son, fully active 6 mo ago then had a fall and since then  walks using cane and since 10/2016 homebound due to weakness PMH CAD stents 2 y ago PPM Asthma DM Parkinsons MI 10 y hosp [facility] 11/20-11/29/2016 with resp distress CHFrEF EF 25% (11/21) NSTEMI (11/21/2016) sent to [facility] for [facility] 11/30-1/8/2017 Patient sent to [facility] because of vomiting on and off x 2 d admitted [facility] 1/8/2017 with distal small bowel obstruction  PMH CAD stents 2 y ago PPM Asthma DM Parkinsons MI 10 y developed  progressive SOB and Bipedam  edema kobi last 3-4 d and admitted [facility] 11/20-11/29/2016 with resp distress sec to  CHF Ruled in for MI 11/20-11/21-11/22 ck [id number] tR 1 .524 (i) - 1.94 (i) -1.92 (i)- 1.04 (i) CHFrEF    11/21 ECHO ef 25% RVSP 75 Mod bl effsn PLEURAL EFFUSION 11/23 CT Ch Mod to large R pl effsn 11/23  dw Dr Preciado  who felt that it is risk prohibitive to hold plavix for thoracentesis ELEVATED LFTS 11/23/2016 AP 62 AST 46   poss sec congestion   AMS 11/23 CT H n COPD  Rxed with  duoneb.3  Patient was sent to [facility] for [facility]  11/30-1/8/2017   ER MEDS GIVEN BEFORE 1/8 11a  ns 1l pepcid 20 ondasetron   1/8/2017 W 14.9 Hb 15.5 Plt 343  Na 137 K 3.7 CO2 37 Cr 1.9  SMALL BOWEL OBSTRUCTION POA 1/8/2017  CTAP NC (1/8)  1) CM 2) Basal atelectasis 3) Sl thickened L adrenal 4) Marked distendd stmach 5) Multiple fluid and gas filled small bowel loops transitioning n RLQ cw high grade small bowel obstruction 6) No pneumatosis free air free fluid or obstructing mass   1/8/2017 npo GI surg eval Repeated xrays show resolution of sbo and abx were stopped as per surg cons recommendation   LEUKOCYTOSIS POSSIBLY SECONDARY TO ABDOMINAL SEPSIS POA 1/8/2017 1/8/2017 W 14.9  1/8/2017 bsab  CKD 3  11/28/2016 Cr 1.7 1/8/2017 Cr 1.9   1/8/2017 Possibly a little dehydrated as 11/28 Hb was 12.2 and now is 15.5   1/8/2017 1/2  (1/8)   COPD BD   CAD Metoprolol 2.5x3 (1/8)   CHFrEF  11/21 ECHO ef 25% RVSP 75 Mod bl   1/8/2017 CHF meds held as hes dehydrated   DIABETES sl scale (1/8)  DIET npo (1/8)   DVT P   HSC (1/8)   SUP Protonix 40 (1/8 Palliative care consult requested due to overall poor prognosis,to discuss advance directives and complete MOLST  Patient was DNR ,DNI he was found unresponsive on 01/11/17 ,family declined agressive tx and heroic measures , patient pronounced 01/11/17 4.58 am 2017 afeb 92 140/80 18 RA 93%  INITIAL EVALUATION    76 lives with son, fully active 6 mo ago then had a fall and since then  walks using cane and since 10/2016 homebound due to weakness PMH CAD stents 2 y ago PPM Asthma DM Parkinsons MI 10 y hosp [facility] P -2016 with resp distress CHFrEF EF 25% () NSTEMI (2016) sent to [facility] for [facility] -2017 Patient sent to [facility] because of vomiting on and off x 2 d admitted [facility] P 2017 with distal small bowel obstruction  PMH CAD stents 2 y ago PPM Asthma DM Parkinsons MI 10 y developed  progressive SOB and Bipedam  edema kobi last 3-4 d and admitted [facility] P -2016 with resp distress sec to  CHF Ruled in for MI -- ck [phone] tR 1 .524 (i) - 1.94 (i) -1.92 (i)- 1.04 (i) CHFrEF     ECHO ef 25% RVSP 75 Mod bl effsn PLEURAL EFFUSION  CT Ch Mod to large R pl effsn   dw Dr Preciado  who felt that it is risk prohibitive to hold plavix for thoracentesis ELEVATED LFTS 2016 AP 62 AST 46   poss sec congestion   AMS  CT H n COPD  Rxed with  duoneb.3  Patient was sent to [facility] for [facility]  -2017   ER MEDS GIVEN BEFORE  11a  ns 1l pepcid 20 ondasetron   2017 W 14.9 Hb 15.5 Plt 343  Na 137 K 3.7 CO2 37 Cr 1.9  SMALL BOWEL OBSTRUCTION POA 2017  CTAP NC ()  1) CM 2) Basal atelectasis 3) Sl thickened L adrenal 4) Marked distendd stmach 5) Multiple fluid and gas filled small bowel loops transitioning n RLQ cw high grade small bowel obstruction 6) No pneumatosis free air free fluid or obstructing mass   2017 npo GI surg eval Repeated xrays show resolution of sbo and abx were stopped as per surg cons recommendation Ruled out for sepsis         CKD 3  2016 Cr 1.7 2017 Cr 1.9   2017 Possibly a little dehydrated as  Hb was 12.2 and now is 15.5   2017  ()   COPD BD   CAD Metoprolol 2.5x3 ()   CHFrEF   ECHO ef 25% RVSP 75 Mod bl   2017 CHF meds held as hes dehydrated   DIABETES sl scale ()  DIET npo ()   DVT P   HSC ()   SUP Protonix 40 ( Palliative care consult requested due to overall poor prognosis,to discuss advance directives and complete MOLST  Patient was DNR ,DNI he was found unresponsive on 17 ,family declined agressive tx and heroic measures , patient pronounced 17 Patient  due to caediorespiratory arrest secondary ro  cardiac arrythmia secondary to CAD with contributinf factor CHF with severly decreased EF 25% 2017 Vital signs on admission  afeb 92 140/80 18 RA 93%  History of ilness  76 lives with son, fully active 6 mo ago then had a fall and since then  walks using cane and since 10/2016 homebound due to weakness PMH CAD stents 2 y ago PPM Asthma DM Parkinsons MI 10 y hosp [facility] P -2016 with resp distress CHFrEF EF 25% () NSTEMI (2016) sent to [facility] for [facility] -2017 Patient sent to [facility] ER because of vomiting on and off x 2 d admitted [facility] P 2017 with distal small bowel obstruction  PMH CAD stents 2 y ago PPM Asthma DM Parkinsons MI 10 y developed  progressive SOB and Bipedam  edema kobi last 3-4 d and admitted [facility] -2016 with resp distress sec to  CHF Ruled in for MI -- ck [phone] tR 1 .524 (i) - 1.94 (i) -1.92 (i)- 1.04 (i) CHFrEF     ECHO ef 25% RVSP 75 Mod bl effsn PLEURAL EFFUSION  CT Ch Mod to large R pl effsn   dw Dr Preciado  who felt that it is risk prohibitive to hold plavix for thoracentesis ELEVATED LFTS 2016 AP 62 AST 46   poss sec congestion   AMS  CT H n COPD  Rxed with  duoneb.3  Patient was sent to [facility] for [facility]  -2017   ER MEDS GIVEN BEFORE  11a  ns 1l pepcid 20 ondasetron   2017 W 14.9 Hb 15.5 Plt 343  Na 137 K 3.7 CO2 37 Cr 1.9  SMALL BOWEL OBSTRUCTION POA and possible sepsis , admitted for iv abx and septic workup   CTAP NC ()  1) CM 2) Basal atelectasis 3) Sl thickened L adrenal 4) Marked distendd stmach 5) Multiple fluid and gas filled small bowel loops transitioning n RLQ cw high grade small bowel obstruction 6) No pneumatosis free air free fluid or obstructing mass   2017 npo GI surg eval  Repeated xrays show resolution of sbo and abx were stopped as per surgical consult recommendation after discussion with  ID  and pulm consults .  Ruled out for sepsis         CKD 3  2016 Cr 1.7 2017 Cr 1.9   2017 Possibly a little dehydrated as  Hb was 12.2 and now is 15.5   2017 1  ()   COPD BD   CAD Metoprolol 2.5x3 ()   CHFrEF   ECHO ef 25% RVSP 75 Mod bl   2017 CHF meds held as hes dehydrated   DIABETES sl scale ()  DIET npo ()   DVT P   HSC ()   SUP Protonix 40 ( Palliative care consult requested due to overall poor prognosis,to discuss advance directives and complete MOLST  Patient was DNR ,DNI he was found unresponsive on 17 ,family declined agressive tx and heroic measures , patient pronounced 17 Patient  due to caediorespiratory arrest secondary ro  cardiac arrythmia secondary to CAD with contributinf factor CHF with severly decreased EF 25% 2017 Vital signs on admission  afeb 92 140/80 18 RA 93%  History of ilness  76 lives with son, fully active 6 mo ago then had a fall and since then  walks using cane and since 10/2016 homebound due to weakness PMH CAD stents 2 y ago PPM Asthma DM Parkinsons MI 10 y hosp [facility] P -2016 with resp distress CHFrEF EF 25% () NSTEMI (2016) sent to [facility] for [facility]O -2017 Patient sent to [facility] ER because of vomiting on and off x 2 d admitted [facility] P 2017 with distal small bowel obstruction  PMH CAD stents 2 y ago PPM Asthma DM Parkinsons MI 10 y developed  progressive SOB and Bipedam  edema kobi last 3-4 d and admitted [facility] -2016 with resp distress sec to  CHF Ruled in for MI -- ck [phone] tR 1 .524 (i) - 1.94 (i) -1.92 (i)- 1.04 (i) CHFrEF     ECHO ef 25% RVSP 75 Mod bl effsn PLEURAL EFFUSION  CT Ch Mod to large R pl effsn   dw Dr Preciado  who felt that it is risk prohibitive to hold plavix for thoracentesis ELEVATED LFTS 2016 AP 62 AST 46   poss sec congestion   AMS  CT H n COPD  Rxed with  duoneb.3  Patient was sent to [facility] for [facility]ACHO  -2017   ER MEDS GIVEN BEFORE  11a  ns 1l pepcid 20 ondasetron   2017 W 14.9 Hb 15.5 Plt 343  Na 137 K 3.7 CO2 37 Cr 1.9  SMALL BOWEL OBSTRUCTION POA and possible sepsis , admitted for iv abx and septic workup   CTAP NC ()  1) CM 2) Basal atelectasis 3) Sl thickened L adrenal 4) Marked distendd stmach 5) Multiple fluid and gas filled small bowel loops transitioning n RLQ cw high grade small bowel obstruction 6) No pneumatosis free air free fluid or obstructing mass   2017 npo GI surg eval  Repeated xrays show resolution of sbo and abx were stopped as per surgical consult recommendation after discussion with  ID  and pulm consults .  Ruled out for sepsis         CKD 3  2016 Cr 1.7 2017 Cr 1.9   2017 Possibly a little dehydrated as  Hb was 12.2 and now is 15.5   2017 1  ()   COPD BD   CAD Metoprolol 2.5x3 ()   CHFrEF   ECHO ef 25% RVSP 75 Mod bl   2017 CHF meds held was npo and  dehydrated with arf on ckd   DIABETES sl scale ()  DIET npo ()   DVT P   HSC ()   SUP Protonix 40 ( Palliative care consult requested due to overall poor prognosis,to discuss advance directives and complete MOLST  Patient was DNR ,DNI  Develped fever and respiratory distress on 01/10/17 ,,seen by pulm Dr Angela and placed on bipap ,septic workup ,chest xray ordered due  possible sepsis,rule out pneumonia . Patient  was found unresponsive on 17 ,family declined agressive tx and heroic measures , patient pronounced 17 Patient  due to caediorespiratory arrest secondary ro  cardiac arrythmia secondary to CAD with contributinf factor CHF with severly decreased EF 25%
